# Patient Record
Sex: FEMALE | Race: WHITE | Employment: FULL TIME | URBAN - METROPOLITAN AREA
[De-identification: names, ages, dates, MRNs, and addresses within clinical notes are randomized per-mention and may not be internally consistent; named-entity substitution may affect disease eponyms.]

---

## 2020-07-14 ENCOUNTER — THERAPY VISIT (OUTPATIENT)
Dept: PHYSICAL THERAPY | Facility: CLINIC | Age: 25
End: 2020-07-14
Payer: COMMERCIAL

## 2020-07-14 DIAGNOSIS — G44.209 TENSION HEADACHE: ICD-10-CM

## 2020-07-14 DIAGNOSIS — M54.2 NECK PAIN: Primary | ICD-10-CM

## 2020-07-14 PROCEDURE — 97140 MANUAL THERAPY 1/> REGIONS: CPT | Mod: GP | Performed by: PHYSICAL THERAPIST

## 2020-07-14 PROCEDURE — 97110 THERAPEUTIC EXERCISES: CPT | Mod: GP | Performed by: PHYSICAL THERAPIST

## 2020-07-14 PROCEDURE — 97161 PT EVAL LOW COMPLEX 20 MIN: CPT | Mod: GP | Performed by: PHYSICAL THERAPIST

## 2020-07-14 NOTE — LETTER
[facility] ATHLETIC [facility] PHYSICAL [facility]  [address]  [facility] [address]  [phone]    July 15, 2020    Re: Marietta Wright   :   1995  MRN:  [id number]   REFERRING PHYSICIAN:   José Manuel Montenegro    [facility] ATHLETIC [facility] PHYSICAL [facility]  Date of Initial Evaluation:  20  Visits:  Rxs Used: 1  Reason for Referral:     Tension headache  Neck pain    EVALUATION SUMMARY  [facility] Initial Evaluation  Subjective:  The history is provided by the patient.   Patient Health History  Marietta Wright being seen for Neck pain/tension HA.   Problem began: 2020.   Problem occurred: started in 2018, did PT at first episode.   Pain is reported as 3/10 on pain scale.  General health as reported by patient is good.  Pertinent medical history includes: mental illness and migraines/headaches.   Current medications:  Anti-depressants and muscle relaxants.    Current occupation is .   Primary job tasks include:  Computer work and prolonged sitting.                Therapist Generated HPI Evaluation  Type of problem:  Cervical spine and thoracic spine.  This is a chronic condition.  Condition occurred with:  Other reason.  Patient reports pain:  Upper cervical spine and upper thoracic (sides of her head).  Pain radiates to:  Shoulder right (feels like a knot that tightens).   Associated symptoms:  Loss of motion/stiffness and headache. Symptoms are exacerbated by lying down and sitting  and relieved by muscle relaxants (would like to decrease muscle relaxant usage; Also wears prism lenses).             Objective:  Standing Alignment:    Cervical/Thoracic:  Forward head  Shoulder/UE:  Rounded shoulders, protracted scapula R and protracted scapula L  Cervical/Thoracic Evaluation  Arom wnl cervical: Ret produce, better, inc ROM.     AROM:  AROM Cervical:  Flexion:          Mod -; CTJ pain  Extension:       Excess upper cervical; min  -  Rotation:         Left: R side pain     Right: L side pain, pt reports it feels restricted  Re: Marietta Wright   :   1995    Side Bend:      Left:     Right:   Strength: poor DNF, poor scap proprio B  Headaches: cervical and migraine (constant cervical tension HA, migraine 2 a month)  Cervical Myotomes:  normal  Cervical Palpation:    Tenderness present at Left:    Sternocleidomstoid; Upper Trap; Facet and Suboccipitals  Tenderness present at Right:    Sternocleidomstoid; Scalenes; Upper Trap; Facet and Suboccipitals  Cervical Stability/Joint Clearing:  normal  Right positive at:  1st Rib Expansion    Assessment/Plan:    Patient is a 24 year old female with cervical and thoracic complaints.    Patient has the following significant findings with corresponding treatment plan.                Diagnosis 1:  Chronic tension HA, neck and upper back pain  Pain -  hot/cold therapy, manual therapy, self management, education, directional preference exercise and home program  Decreased ROM/flexibility - manual therapy, therapeutic exercise and home program  Decreased joint mobility - manual therapy, therapeutic exercise and home program  Decreased strength - therapeutic exercise, therapeutic activities and home program  Decreased proprioception - neuro re-education, therapeutic activities and home program  Impaired posture - neuro re-education, therapeutic activities and home program    Therapy Evaluation Codes:   Cumulative Therapy Evaluation is: Low complexity.  Previous and current functional limitations:  (See Goal Flow Sheet for this information)    Short term and Long term goals: (See Goal Flow Sheet for this information)   Communication ability:  Patient appears to be able to clearly communicate and understand verbal and written communication and follow directions correctly.  Treatment Explanation - The following has been discussed with the patient:   RX ordered/plan of care  Anticipated outcomes  Possible  risks and side effects  This patient would benefit from PT intervention to resume normal activities.   Rehab potential is good.  Frequency:  1 X week, once daily  Duration:  for 6 weeks tapering to 2 X a month over 4 weeks  Discharge Plan:  Achieve all LTG.  Independent in home treatment program.  Reach maximal therapeutic benefit.  Please refer to the daily flowsheet for treatment today, total treatment time and time spent performing 1:1 timed codes.     Thank you for your referral.  INQUIRIES  Therapist: Aline Whitt DPT, [facility]  [facility] PH[facility]ICAL THERAPY  [address] [place] [id number]  Phone: [phone]  Fax: [phone]

## 2020-07-14 NOTE — PROGRESS NOTES
[facility] Initial Evaluation  Subjective:  The history is provided by the patient.   Patient Health History  Marietta Wright being seen for Neck pain/tension HA.     Problem began: 7/13/2020.   Problem occurred: started in 2018, did PT at first episode.   Pain is reported as 3/10 on pain scale.  General health as reported by patient is good.  Pertinent medical history includes: mental illness and migraines/headaches.            Current medications:  Anti-depressants and muscle relaxants.    Current occupation is .   Primary job tasks include:  Computer work and prolonged sitting.                  Therapist Generated HPI Evaluation         Type of problem:  Cervical spine and thoracic spine.    This is a chronic condition.  Condition occurred with:  Other reason.    Patient reports pain:  Upper cervical spine and upper thoracic (sides of her head).    Pain radiates to:  Shoulder right (feels like a knot that tightens).     Associated symptoms:  Loss of motion/stiffness and headache. Symptoms are exacerbated by lying down and sitting  and relieved by muscle relaxants (would like to decrease muscle relaxant usage; Also wears prism lenses).                              Objective:  Standing Alignment:    Cervical/Thoracic:  Forward head  Shoulder/UE:  Rounded shoulders, protracted scapula R and protracted scapula L                                  Cervical/Thoracic Evaluation  Arom wnl cervical: Ret produce, better, inc ROM.     AROM:  AROM Cervical:    Flexion:          Mod -; CTJ pain  Extension:       Excess upper cervical; min -  Rotation:         Left: R side pain     Right: L side pain, pt reports it feels restricted  Side Bend:      Left:     Right:     Strength: poor DNF, poor scap proprio B  Headaches: cervical and migraine (constant cervical tension HA, migraine 2 a month)  Cervical Myotomes:  normal                        Cervical Palpation:    Tenderness present at Left:     Sternocleidomstoid; Upper Trap; Facet and Suboccipitals  Tenderness present at Right:    Sternocleidomstoid; Scalenes; Upper Trap; Facet and Suboccipitals    Cervical Stability/Joint Clearing:  normal    Right positive at:  1st Rib Expansion                                              General     ROS    Assessment/Plan:    Patient is a 24 year old female with cervical and thoracic complaints.    Patient has the following significant findings with corresponding treatment plan.                Diagnosis 1:  Chronic tension HA, neck and upper back pain  Pain -  hot/cold therapy, manual therapy, self management, education, directional preference exercise and home program  Decreased ROM/flexibility - manual therapy, therapeutic exercise and home program  Decreased joint mobility - manual therapy, therapeutic exercise and home program  Decreased strength - therapeutic exercise, therapeutic activities and home program  Decreased proprioception - neuro re-education, therapeutic activities and home program  Impaired posture - neuro re-education, therapeutic activities and home program    Therapy Evaluation Codes:     Cumulative Therapy Evaluation is: Low complexity.    Previous and current functional limitations:  (See Goal Flow Sheet for this information)    Short term and Long term goals: (See Goal Flow Sheet for this information)     Communication ability:  Patient appears to be able to clearly communicate and understand verbal and written communication and follow directions correctly.  Treatment Explanation - The following has been discussed with the patient:   RX ordered/plan of care  Anticipated outcomes  Possible risks and side effects  This patient would benefit from PT intervention to resume normal activities.   Rehab potential is good.    Frequency:  1 X week, once daily  Duration:  for 6 weeks tapering to 2 X a month over 4 weeks  Discharge Plan:  Achieve all LTG.  Independent in home treatment program.  Reach  maximal therapeutic benefit.    Please refer to the daily flowsheet for treatment today, total treatment time and time spent performing 1:1 timed codes.

## 2020-07-22 ENCOUNTER — THERAPY VISIT (OUTPATIENT)
Dept: PHYSICAL THERAPY | Facility: CLINIC | Age: 25
End: 2020-07-22
Payer: COMMERCIAL

## 2020-07-22 DIAGNOSIS — G44.209 TENSION HEADACHE: ICD-10-CM

## 2020-07-22 PROCEDURE — 97140 MANUAL THERAPY 1/> REGIONS: CPT | Mod: GP | Performed by: PHYSICAL THERAPIST

## 2020-07-22 PROCEDURE — 97110 THERAPEUTIC EXERCISES: CPT | Mod: GP | Performed by: PHYSICAL THERAPIST

## 2020-07-28 ENCOUNTER — THERAPY VISIT (OUTPATIENT)
Dept: PHYSICAL THERAPY | Facility: CLINIC | Age: 25
End: 2020-07-28
Payer: COMMERCIAL

## 2020-07-28 DIAGNOSIS — G44.209 TENSION HEADACHE: ICD-10-CM

## 2020-07-28 PROCEDURE — 97112 NEUROMUSCULAR REEDUCATION: CPT | Mod: GP | Performed by: PHYSICAL THERAPIST

## 2020-07-28 PROCEDURE — 97140 MANUAL THERAPY 1/> REGIONS: CPT | Mod: GP | Performed by: PHYSICAL THERAPIST

## 2020-07-28 PROCEDURE — 97110 THERAPEUTIC EXERCISES: CPT | Mod: GP | Performed by: PHYSICAL THERAPIST

## 2020-08-05 ENCOUNTER — THERAPY VISIT (OUTPATIENT)
Dept: PHYSICAL THERAPY | Facility: CLINIC | Age: 25
End: 2020-08-05
Payer: COMMERCIAL

## 2020-08-05 DIAGNOSIS — G44.209 TENSION HEADACHE: ICD-10-CM

## 2020-08-05 PROCEDURE — 97110 THERAPEUTIC EXERCISES: CPT | Mod: GP | Performed by: PHYSICAL THERAPIST

## 2020-08-05 PROCEDURE — 97140 MANUAL THERAPY 1/> REGIONS: CPT | Mod: GP | Performed by: PHYSICAL THERAPIST

## 2020-08-12 ENCOUNTER — THERAPY VISIT (OUTPATIENT)
Dept: PHYSICAL THERAPY | Facility: CLINIC | Age: 25
End: 2020-08-12
Payer: COMMERCIAL

## 2020-08-12 DIAGNOSIS — G44.209 TENSION HEADACHE: ICD-10-CM

## 2020-08-12 PROCEDURE — 97112 NEUROMUSCULAR REEDUCATION: CPT | Mod: GP | Performed by: PHYSICAL THERAPIST

## 2020-08-12 PROCEDURE — 97140 MANUAL THERAPY 1/> REGIONS: CPT | Mod: GP | Performed by: PHYSICAL THERAPIST

## 2020-08-12 PROCEDURE — 97110 THERAPEUTIC EXERCISES: CPT | Mod: GP | Performed by: PHYSICAL THERAPIST

## 2020-08-26 ENCOUNTER — THERAPY VISIT (OUTPATIENT)
Dept: PHYSICAL THERAPY | Facility: CLINIC | Age: 25
End: 2020-08-26
Payer: COMMERCIAL

## 2020-08-26 DIAGNOSIS — G44.209 TENSION HEADACHE: ICD-10-CM

## 2020-08-26 PROCEDURE — 97110 THERAPEUTIC EXERCISES: CPT | Mod: GP | Performed by: PHYSICAL THERAPIST

## 2020-08-26 PROCEDURE — 97140 MANUAL THERAPY 1/> REGIONS: CPT | Mod: GP | Performed by: PHYSICAL THERAPIST

## 2020-09-16 ENCOUNTER — THERAPY VISIT (OUTPATIENT)
Dept: PHYSICAL THERAPY | Facility: CLINIC | Age: 25
End: 2020-09-16
Payer: COMMERCIAL

## 2020-09-16 DIAGNOSIS — G44.209 TENSION HEADACHE: ICD-10-CM

## 2020-09-16 PROCEDURE — 97112 NEUROMUSCULAR REEDUCATION: CPT | Mod: GP | Performed by: PHYSICAL THERAPIST

## 2020-09-16 PROCEDURE — 97110 THERAPEUTIC EXERCISES: CPT | Mod: GP | Performed by: PHYSICAL THERAPIST

## 2020-10-06 ENCOUNTER — THERAPY VISIT (OUTPATIENT)
Dept: PHYSICAL THERAPY | Facility: CLINIC | Age: 25
End: 2020-10-06
Payer: COMMERCIAL

## 2020-10-06 DIAGNOSIS — M54.2 NECK PAIN: Primary | ICD-10-CM

## 2020-10-06 DIAGNOSIS — G44.209 TENSION HEADACHE: ICD-10-CM

## 2020-10-06 PROCEDURE — 97140 MANUAL THERAPY 1/> REGIONS: CPT | Mod: GP | Performed by: PHYSICAL THERAPIST

## 2020-10-06 PROCEDURE — 97110 THERAPEUTIC EXERCISES: CPT | Mod: GP | Performed by: PHYSICAL THERAPIST

## 2020-10-06 PROCEDURE — 97112 NEUROMUSCULAR REEDUCATION: CPT | Mod: GP | Performed by: PHYSICAL THERAPIST

## 2020-10-06 NOTE — LETTER
[facility] ATHLETIC [facility] PHYSICAL [facility]  [address]  [facility] [address]  [phone]    2020    Re: Marietta Wright   :   1995  MRN:  [id number]   REFERRING PHYSICIAN:   José Manuel Montenegro    [facility] ATHLETIC [facility] PHYSICAL [facility]    Date of Initial Evaluation:  20  Visits:  Rxs Used: 8  Reason for Referral:     Tension headache  Neck pain    PROGRESS  REPORT  Progress reporting period is from 2020 to 10/6/2020.     SUBJECTIVE  Subjective: Was camping this weekend, less sleep and busier schedule has had mild neck pain. Averages 1 HA/wk 4 or less out of 10 on a pain scale. Is able to mitigate sx with PT exercises.       The subjective and objective information are from the last SOAP note on this patient.    OBJECTIVE  Objective: Dec OH flex and Abd; B shoulder stiffness, Hypomobility upper thoracic spine. Improves with foam roller mobilization. L knee flex boggy at end range PROM; ttp distal med HS to pes bursa, prox med head gastroc tender; RPT knee ext with OP produce, better.       ASSESSMENT/PLAN  Updated problem list and treatment plan:  Neck Pain/HA    Pain -  hot/cold therapy, manual therapy, self management, education, directional preference exercise and home program  Decreased ROM/flexibility - manual therapy, therapeutic exercise and home program  Decreased joint mobility - manual therapy, therapeutic exercise and home program  Decreased strength - therapeutic exercise, therapeutic activities and home program  Decreased proprioception - neuro re-education, therapeutic activities and home program  Impaired posture - neuro re-education, therapeutic activities and home program  STG/LTGs have been met or progress has been made towards goals:  Yes (See Goal flow sheet completed today.)  Re: Marietta Wright   :   1995    Assessment of Progress: The patient's condition has potential to improve.  Patient is meeting short  term goals and is progressing towards long term goals.  Self Management Plans:  Patient has been instructed in a home treatment program.  Patient is independent in a home treatment program.  Patient  has been instructed in self management of symptoms.  I have re-evaluated this patient and find that the nature, scope, duration and intensity of the therapy is appropriate for the medical condition of the patient.  Marietta continues to require the following intervention to meet STG and LTG's: PT  The patient is returning to your office for a recheck appointment.    Recommendations:  This patient would benefit from continued therapy.     Frequency:  2 X a month, once daily  Duration:  for 2 months    Thank you for your referral.    INQUIRIES  Therapist: Cecille Whitt DPT   [facility] [place] [facility]ICAL THERAPY  [address] [place] [address] [address] [place] [id number]  Phone: [phone]  Fax: [phone]

## 2020-11-17 ENCOUNTER — THERAPY VISIT (OUTPATIENT)
Dept: PHYSICAL THERAPY | Facility: CLINIC | Age: 25
End: 2020-11-17
Payer: COMMERCIAL

## 2020-11-17 DIAGNOSIS — G44.209 TENSION HEADACHE: ICD-10-CM

## 2020-11-17 DIAGNOSIS — M54.2 NECK PAIN: Primary | ICD-10-CM

## 2020-11-17 PROCEDURE — 97112 NEUROMUSCULAR REEDUCATION: CPT | Mod: GP | Performed by: PHYSICAL THERAPIST

## 2020-11-17 PROCEDURE — 97110 THERAPEUTIC EXERCISES: CPT | Mod: GP | Performed by: PHYSICAL THERAPIST

## 2020-12-08 ENCOUNTER — THERAPY VISIT (OUTPATIENT)
Dept: PHYSICAL THERAPY | Facility: CLINIC | Age: 25
End: 2020-12-08
Payer: COMMERCIAL

## 2020-12-08 DIAGNOSIS — G44.209 TENSION HEADACHE: ICD-10-CM

## 2020-12-08 DIAGNOSIS — M54.2 NECK PAIN: Primary | ICD-10-CM

## 2020-12-08 PROCEDURE — 97110 THERAPEUTIC EXERCISES: CPT | Mod: GP | Performed by: PHYSICAL THERAPIST

## 2020-12-08 PROCEDURE — 97140 MANUAL THERAPY 1/> REGIONS: CPT | Mod: GP | Performed by: PHYSICAL THERAPIST

## 2021-01-05 ENCOUNTER — THERAPY VISIT (OUTPATIENT)
Dept: PHYSICAL THERAPY | Facility: CLINIC | Age: 26
End: 2021-01-05
Payer: COMMERCIAL

## 2021-01-05 DIAGNOSIS — M25.562 LEFT KNEE PAIN: ICD-10-CM

## 2021-01-05 DIAGNOSIS — M54.59 POSTURAL LOW BACK PAIN: ICD-10-CM

## 2021-01-05 DIAGNOSIS — M54.2 NECK PAIN: Primary | ICD-10-CM

## 2021-01-05 DIAGNOSIS — G44.209 TENSION HEADACHE: ICD-10-CM

## 2021-01-05 PROCEDURE — 97140 MANUAL THERAPY 1/> REGIONS: CPT | Mod: GP | Performed by: PHYSICAL THERAPIST

## 2021-01-05 PROCEDURE — 97110 THERAPEUTIC EXERCISES: CPT | Mod: GP | Performed by: PHYSICAL THERAPIST

## 2021-01-05 NOTE — LETTER
[facility]  [address]  [facility] [address]  [phone]    2021    Re: Marietta Wright   :   1995  MRN:  [id number]   REFERRING PHYSICIAN:   José Manuel Montenegro    [facility]    Date of Initial Evaluation:  20  Visits:  Rxs Used: 11  Reason for Referral:     Tension headache  Neck pain  Left knee pain  Postural low back pain    PROGRESS  REPORT  Progress reporting period is from 10/6/2020 to 2021.     SUBJECTIVE  Subjective: L knee and R low back bothersome. Primarily home workouts. Neck pain/HA inc yesterday w/RTW, but overall feeling better.       Changes in function: Yes, see goal flow sheet for change in function     The subjective and objective information are from the last SOAP note on this patient.    OBJECTIVE  Objective: R lateral shift noted with gait; lumbar AROM flex WNL, ext mod - with inc pain, SB produces R side pain. Inc tone L gastroc, TTP L popliteal space at HS tendon insertion. R side T10-12 hypomobile, ttp along R side ribs, dec mobility thoracolumbar paraspinals      ASSESSMENT/PLAN  Updated problem list and treatment plan:   Diagnosis 1:  Neck pain/HA    Pain -  self management, education, directional preference exercise and home program  Decreased strength - therapeutic exercise, therapeutic activities and home program  Decreased proprioception - neuro re-education, therapeutic activities and home program  Impaired posture - neuro re-education, therapeutic activities and home program      Re: Marietta Wright   :   1995    Diagnosis 2:  R side low back/L knee pain    Pain -  hot/cold therapy, manual therapy, self management, education and home program  Decreased ROM/flexibility - manual therapy, therapeutic exercise and home program  Decreased joint mobility - manual therapy, therapeutic exercise and home program  Decreased strength -  therapeutic exercise, therapeutic activities and home program  Impaired gait - gait training and home program  Decreased function - therapeutic activities and home program  STG/LTGs have been met or progress has been made towards goals:  Yes (See Goal flow sheet completed today.)  Assessment of Progress: The patient's condition is improving.  The patient's condition has potential to improve.  The patient has had set backs in their progress.  Patient is meeting short term goals and is progressing towards long term goals.  Self Management Plans:  Patient has been instructed in a home treatment program.  Patient  has been instructed in self management of symptoms.  I have re-evaluated this patient and find that the nature, scope, duration and intensity of the therapy is appropriate for the medical condition of the patient.  Marietta continues to require the following intervention to meet STG and LTG's: PT    Recommendations:  This patient would benefit from continued therapy.     Frequency:  2 X a month, once daily  Duration:  for 2 months    Thank you for your referral.    INQUIRIES  Therapist: Cecille Whitt DPT, [facility]   [facility] [place] PH[facility]ICAL THERAPY  [address] [place] [id number]  Phone: [phone]  Fax: [phone]

## 2021-01-05 NOTE — PROGRESS NOTES
Subjective:  HPI  Physical Exam                    Objective:  System    Physical Exam    General     ROS    Assessment/Plan:    PROGRESS  REPORT    Progress reporting period is from 10/6/2020 to 1/5/2021.     SUBJECTIVE  Subjective: L knee and R low back bothersome. Primarily home workouts. Neck pain/HA inc yesterday w/RTW, but overall feeling better.       Changes in function: Yes, see goal flow sheet for change in function     The subjective and objective information are from the last SOAP note on this patient.    OBJECTIVE  Objective: R lateral shift noted with gait; lumbar AROM flex WNL, ext mod - with inc pain, SB produces R side pain. Inc tone L gastroc, TTP L popliteal space at HS tendon insertion. R side T10-12 hypomobile, ttp along R side ribs, dec mobility thoracolumbar paraspinals      ASSESSMENT/PLAN  Updated problem list and treatment plan:   Diagnosis 1:  Neck pain/HA    Pain -  self management, education, directional preference exercise and home program  Decreased strength - therapeutic exercise, therapeutic activities and home program  Decreased proprioception - neuro re-education, therapeutic activities and home program  Impaired posture - neuro re-education, therapeutic activities and home program  Diagnosis 2:  R side low back/L knee pain     Pain -  hot/cold therapy, manual therapy, self management, education and home program  Decreased ROM/flexibility - manual therapy, therapeutic exercise and home program  Decreased joint mobility - manual therapy, therapeutic exercise and home program  Decreased strength - therapeutic exercise, therapeutic activities and home program  Impaired gait - gait training and home program  Decreased function - therapeutic activities and home program  STG/LTGs have been met or progress has been made towards goals:  Yes (See Goal flow sheet completed today.)  Assessment of Progress: The patient's condition is improving.  The patient's condition has potential to  improve.  The patient has had set backs in their progress.  Patient is meeting short term goals and is progressing towards long term goals.  Self Management Plans:  Patient has been instructed in a home treatment program.  Patient  has been instructed in self management of symptoms.  I have re-evaluated this patient and find that the nature, scope, duration and intensity of the therapy is appropriate for the medical condition of the patient.  Marietta continues to require the following intervention to meet STG and LTG's: PT      Recommendations:  This patient would benefit from continued therapy.     Frequency:  2 X a month, once daily  Duration:  for 2 months        Please refer to the daily flowsheet for treatment today, total treatment time and time spent performing 1:1 timed codes.

## 2021-01-14 ENCOUNTER — THERAPY VISIT (OUTPATIENT)
Dept: PHYSICAL THERAPY | Facility: CLINIC | Age: 26
End: 2021-01-14
Payer: COMMERCIAL

## 2021-01-14 DIAGNOSIS — M54.59 POSTURAL LOW BACK PAIN: ICD-10-CM

## 2021-01-14 DIAGNOSIS — M25.562 LEFT KNEE PAIN: ICD-10-CM

## 2021-01-14 DIAGNOSIS — M54.2 NECK PAIN: Primary | ICD-10-CM

## 2021-01-14 DIAGNOSIS — G44.209 TENSION HEADACHE: ICD-10-CM

## 2021-01-14 PROCEDURE — 97140 MANUAL THERAPY 1/> REGIONS: CPT | Mod: GP | Performed by: PHYSICAL THERAPIST

## 2021-01-14 PROCEDURE — 97110 THERAPEUTIC EXERCISES: CPT | Mod: GP | Performed by: PHYSICAL THERAPIST

## 2021-02-04 ENCOUNTER — THERAPY VISIT (OUTPATIENT)
Dept: PHYSICAL THERAPY | Facility: CLINIC | Age: 26
End: 2021-02-04
Payer: COMMERCIAL

## 2021-02-04 DIAGNOSIS — M54.59 POSTURAL LOW BACK PAIN: ICD-10-CM

## 2021-02-04 DIAGNOSIS — G44.209 TENSION HEADACHE: ICD-10-CM

## 2021-02-04 DIAGNOSIS — M25.562 LEFT KNEE PAIN: ICD-10-CM

## 2021-02-04 DIAGNOSIS — M54.2 NECK PAIN: Primary | ICD-10-CM

## 2021-02-04 PROCEDURE — 97140 MANUAL THERAPY 1/> REGIONS: CPT | Mod: GP | Performed by: PHYSICAL THERAPIST

## 2021-02-04 PROCEDURE — 97112 NEUROMUSCULAR REEDUCATION: CPT | Mod: GP | Performed by: PHYSICAL THERAPIST

## 2021-02-11 ENCOUNTER — THERAPY VISIT (OUTPATIENT)
Dept: PHYSICAL THERAPY | Facility: CLINIC | Age: 26
End: 2021-02-11
Payer: COMMERCIAL

## 2021-02-11 DIAGNOSIS — G44.209 TENSION HEADACHE: ICD-10-CM

## 2021-02-11 DIAGNOSIS — M25.562 LEFT KNEE PAIN: ICD-10-CM

## 2021-02-11 DIAGNOSIS — M54.2 NECK PAIN: Primary | ICD-10-CM

## 2021-02-11 PROCEDURE — 97140 MANUAL THERAPY 1/> REGIONS: CPT | Mod: GP | Performed by: PHYSICAL THERAPIST

## 2021-02-11 PROCEDURE — 97110 THERAPEUTIC EXERCISES: CPT | Mod: GP | Performed by: PHYSICAL THERAPIST

## 2021-03-02 ENCOUNTER — THERAPY VISIT (OUTPATIENT)
Dept: PHYSICAL THERAPY | Facility: CLINIC | Age: 26
End: 2021-03-02
Payer: COMMERCIAL

## 2021-03-02 DIAGNOSIS — G44.209 TENSION HEADACHE: ICD-10-CM

## 2021-03-02 DIAGNOSIS — M54.2 NECK PAIN: Primary | ICD-10-CM

## 2021-03-02 PROCEDURE — 97140 MANUAL THERAPY 1/> REGIONS: CPT | Mod: GP | Performed by: PHYSICAL THERAPIST

## 2021-03-02 NOTE — LETTER
[facility] ATHLETIC [facility] PHYSICAL [facility]  [address]  [facility] [address]  [phone]    2021    Re: Marietta Wright   :   1995  MRN:  [id number]   REFERRING PHYSICIAN:   José Manuel Montenegro    [facility] ATHLETIC [facility] PHYSICAL [facility]    Date of Initial Evaluation:  20  Visits:  Rxs Used: 15  Reason for Referral:     Tension headache  Neck pain    PROGRESS  REPORT  Progress reporting period is from 2021 to 3/2/2021.     SUBJECTIVE  Subjective: Has had inc HA the last 2 weeks, has had difficulty resolving on her own. Attributes it to increased time studying for next exam. Saw the Chiropractor which was helpful but temporary. Some sensitivity to light with HA. Tried her neck HEP, without relief       Changes in function: Yes, see goal flow sheet for change in function   Adverse reactions:inc studying/desk time produced worsening HA   The subjective and objective information are from the last SOAP note on this patient.    OBJECTIVE  Objective: Cspine AROM; dec motion R SB and L Rot (pt states she fell bouldering this weekend, some neck soreness after that). Ret mod -. L > R suboccipital restriction, B levator tightness, R subclavius tightness.      ASSESSMENT/PLAN  Updated problem list and treatment plan:   Diagnosis 1:  Neck Pain/HA    Pain -  hot/cold therapy, manual therapy, self management, education, directional preference exercise and home program  Decreased ROM/flexibility - manual therapy, therapeutic exercise and home program  Decreased joint mobility - manual therapy, therapeutic exercise and home program  Decreased strength - therapeutic exercise, therapeutic activities and home program  Impaired posture - neuro re-education and home program  Diagnosis 2:  L knee Pain     Re: Marietta Wright   :   1995    Did not treat today; Pain, Dec flexibility and Dec Strength. Pt continues to benefit from skilled PT for manual  therapy and HEP  STG/LTGs have been met or progress has been made towards goals:  Yes (See Goal flow sheet completed today.)  Assessment of Progress: The patient's condition has potential to improve.  The patient has had set backs in their progress.  Self Management Plans:  Patient has been instructed in a home treatment program.  Patient is independent in a home treatment program.  Patient  has been instructed in self management of symptoms.  I have re-evaluated this patient and find that the nature, scope, duration and intensity of the therapy is appropriate for the medical condition of the patient.  Marietta continues to require the following intervention to meet STG and LTG's: PT    Recommendations:  This patient would benefit from continued therapy.     Frequency:  2 X a month, once daily  Duration:  for 2 months    Thank you for your referral.    INQUIRIES  Therapist: Cecille Whitt DPT   [facility] [place] PHYSICAL THERAPY  [address] [place], [place] [id number]  [facility] [address]  Phone: [phone]  Fax: [phone]

## 2021-03-02 NOTE — PROGRESS NOTES
Subjective:  HPI  Physical Exam                    Objective:  System    Physical Exam    General     ROS    Assessment/Plan:    PROGRESS  REPORT    Progress reporting period is from 1/5/2021 to 3/2/2021.     SUBJECTIVE  Subjective: Has had inc HA the last 2 weeks, has had difficulty resolving on her own. Attributes it to increased time studying for next exam. Saw the Chiropractor which was helpful but temporary. Some sensitivity to light with HA. Tried her neck HEP, without relief       Changes in function: Yes, see goal flow sheet for change in function   Adverse reactions:inc studying/desk time produced worsening HA   The subjective and objective information are from the last SOAP note on this patient.    OBJECTIVE  Objective: Cspine AROM; dec motion R SB and L Rot (pt states she fell bouldering this weekend, some neck soreness after that). Ret mod -. L > R suboccipital restriction, B levator tightness, R subclavius tightness.      ASSESSMENT/PLAN  Updated problem list and treatment plan:   Diagnosis 1:  Neck Pain/HA    Pain -  hot/cold therapy, manual therapy, self management, education, directional preference exercise and home program  Decreased ROM/flexibility - manual therapy, therapeutic exercise and home program  Decreased joint mobility - manual therapy, therapeutic exercise and home program  Decreased strength - therapeutic exercise, therapeutic activities and home program  Impaired posture - neuro re-education and home program  Diagnosis 2:  L knee Pain     Did not treat today; Pain, Dec flexibility and Dec Strength. Pt continues to benefit from skilled PT for manual therapy and HEP  STG/LTGs have been met or progress has been made towards goals:  Yes (See Goal flow sheet completed today.)  Assessment of Progress: The patient's condition has potential to improve.  The patient has had set backs in their progress.  Self Management Plans:  Patient has been instructed in a home treatment program.  Patient is  independent in a home treatment program.  Patient  has been instructed in self management of symptoms.  I have re-evaluated this patient and find that the nature, scope, duration and intensity of the therapy is appropriate for the medical condition of the patient.  Marietta continues to require the following intervention to meet STG and LTG's: PT      Recommendations:  This patient would benefit from continued therapy.     Frequency:  2 X a month, once daily  Duration:  for 2 months        Please refer to the daily flowsheet for treatment today, total treatment time and time spent performing 1:1 timed codes.

## 2021-03-10 ENCOUNTER — THERAPY VISIT (OUTPATIENT)
Dept: PHYSICAL THERAPY | Facility: CLINIC | Age: 26
End: 2021-03-10
Payer: COMMERCIAL

## 2021-03-10 DIAGNOSIS — M54.2 NECK PAIN: Primary | ICD-10-CM

## 2021-03-10 DIAGNOSIS — G44.209 TENSION HEADACHE: ICD-10-CM

## 2021-03-10 PROCEDURE — 97112 NEUROMUSCULAR REEDUCATION: CPT | Mod: GP | Performed by: PHYSICAL THERAPIST

## 2021-03-10 PROCEDURE — 97140 MANUAL THERAPY 1/> REGIONS: CPT | Mod: GP | Performed by: PHYSICAL THERAPIST

## 2021-03-24 ENCOUNTER — THERAPY VISIT (OUTPATIENT)
Dept: PHYSICAL THERAPY | Facility: CLINIC | Age: 26
End: 2021-03-24
Payer: COMMERCIAL

## 2021-03-24 DIAGNOSIS — G44.209 TENSION HEADACHE: ICD-10-CM

## 2021-03-24 PROCEDURE — 97110 THERAPEUTIC EXERCISES: CPT | Mod: GP | Performed by: PHYSICAL THERAPIST

## 2021-03-24 PROCEDURE — 97140 MANUAL THERAPY 1/> REGIONS: CPT | Mod: GP | Performed by: PHYSICAL THERAPIST

## 2021-04-08 ENCOUNTER — THERAPY VISIT (OUTPATIENT)
Dept: PHYSICAL THERAPY | Facility: CLINIC | Age: 26
End: 2021-04-08
Payer: COMMERCIAL

## 2021-04-08 DIAGNOSIS — G44.209 TENSION HEADACHE: ICD-10-CM

## 2021-04-08 DIAGNOSIS — M54.2 NECK PAIN: Primary | ICD-10-CM

## 2021-04-08 PROCEDURE — 97140 MANUAL THERAPY 1/> REGIONS: CPT | Mod: GP | Performed by: PHYSICAL THERAPIST

## 2021-04-08 PROCEDURE — 97110 THERAPEUTIC EXERCISES: CPT | Mod: GP | Performed by: PHYSICAL THERAPIST

## 2021-04-27 ENCOUNTER — THERAPY VISIT (OUTPATIENT)
Dept: PHYSICAL THERAPY | Facility: CLINIC | Age: 26
End: 2021-04-27
Payer: COMMERCIAL

## 2021-04-27 DIAGNOSIS — M54.2 NECK PAIN: Primary | ICD-10-CM

## 2021-04-27 DIAGNOSIS — M54.59 POSTURAL LOW BACK PAIN: ICD-10-CM

## 2021-04-27 DIAGNOSIS — G44.209 TENSION HEADACHE: ICD-10-CM

## 2021-04-27 PROCEDURE — 97140 MANUAL THERAPY 1/> REGIONS: CPT | Mod: GP | Performed by: PHYSICAL THERAPIST

## 2021-04-27 PROCEDURE — 97110 THERAPEUTIC EXERCISES: CPT | Mod: GP | Performed by: PHYSICAL THERAPIST

## 2021-05-12 ENCOUNTER — THERAPY VISIT (OUTPATIENT)
Dept: PHYSICAL THERAPY | Facility: CLINIC | Age: 26
End: 2021-05-12
Payer: COMMERCIAL

## 2021-05-12 DIAGNOSIS — M54.2 NECK PAIN: Primary | ICD-10-CM

## 2021-05-12 DIAGNOSIS — G44.209 TENSION HEADACHE: ICD-10-CM

## 2021-05-12 PROCEDURE — 97140 MANUAL THERAPY 1/> REGIONS: CPT | Mod: GP | Performed by: PHYSICAL THERAPIST

## 2021-05-12 PROCEDURE — 97112 NEUROMUSCULAR REEDUCATION: CPT | Mod: GP | Performed by: PHYSICAL THERAPIST

## 2021-05-12 PROCEDURE — 97110 THERAPEUTIC EXERCISES: CPT | Mod: GP | Performed by: PHYSICAL THERAPIST

## 2021-06-23 ENCOUNTER — THERAPY VISIT (OUTPATIENT)
Dept: PHYSICAL THERAPY | Facility: CLINIC | Age: 26
End: 2021-06-23
Payer: COMMERCIAL

## 2021-06-23 DIAGNOSIS — G44.209 TENSION HEADACHE: ICD-10-CM

## 2021-06-23 PROCEDURE — 97110 THERAPEUTIC EXERCISES: CPT | Mod: GP | Performed by: PHYSICAL THERAPIST

## 2021-06-23 PROCEDURE — 97140 MANUAL THERAPY 1/> REGIONS: CPT | Mod: GP | Performed by: PHYSICAL THERAPIST

## 2021-06-23 NOTE — PROGRESS NOTES
Subjective:  HPI  Physical Exam                    Objective:  System    Physical Exam    General     ROS    Assessment/Plan:    PROGRESS  REPORT    Progress reporting period is from 7/14/2020 to 6/23/21.     SUBJECTIVE  Subjective: Few migraines if any, neck is feeling better. Slight temple HA today on the R. back is feeling better with HEP.  Working out regularly        The subjective and objective information are from the last SOAP note on this patient.    OBJECTIVE  Objective: Cspine AROM WNL, R shoulder abd AROM restricted > 100 deg with catch on R post shoulder. Lumbar AROM wnl. Good glute max proprio B.       ASSESSMENT/PLAN  Updated problem list and treatment plan:    Neck Pain, HA    Decreased ROM/flexibility - manual therapy, therapeutic exercise and home program  Decreased strength - therapeutic exercise, therapeutic activities and home program  Decreased proprioception - neuro re-education, therapeutic activities and home program  Impaired posture - neuro re-education and home program  STG/LTGs have been met or progress has been made towards goals:  Yes (See Goal flow sheet completed today.)  Assessment of Progress: The patient's condition has potential to improve.  The patient has had set backs in their progress.  Self Management Plans:  Patient has been instructed in a home treatment program.  Patient is independent in a home treatment program.  Patient  has been instructed in self management of symptoms.  I have re-evaluated this patient and find that the nature, scope, duration and intensity of the therapy is appropriate for the medical condition of the patient.  Marietta continues to require the following intervention to meet STG and LTG's: PT    Marietta demonstrates functional improvements which indicate she is ready for independence in HEP in order to maintain Cspine mobility, manage HA sx and make functional progress in strength. Marietta would benefit from re-evaluation in 4-6 wks in order to establish a  plan for sx management with return to office in September.    The patient is returning to your office for a recheck appointment.    Recommendations:  This patient would benefit from further evaluation.    Please refer to the daily flowsheet for treatment today, total treatment time and time spent performing 1:1 timed codes.

## 2021-06-23 NOTE — LETTER
[facility]  [address]  [place] [address]  [phone]    2021    Re: Marietta Wright   :   1995  MRN:  [id number]   REFERRING PHYSICIAN:   José Manuel GAS[facility]    Date of Initial Evaluation:  20  Visits:  Rxs Used: 21  Reason for Referral:  Tension headache    PROGRESS  REPORT  Progress reporting period is from 2020 to 21.     SUBJECTIVE  Subjective: Few migraines if any, neck is feeling better. Slight temple HA today on the R. back is feeling better with HEP.  Working out regularly        The subjective and objective information are from the last SOAP note on this patient.    OBJECTIVE  Objective: Cspine AROM WNL, R shoulder abd AROM restricted > 100 deg with catch on R post shoulder. Lumbar AROM wnl. Good glute max proprio B.       ASSESSMENT/PLAN  Updated problem list and treatment plan:    Neck Pain, HA    Decreased ROM/flexibility - manual therapy, therapeutic exercise and home program  Decreased strength - therapeutic exercise, therapeutic activities and home program  Decreased proprioception - neuro re-education, therapeutic activities and home program  Impaired posture - neuro re-education and home program  STG/LTGs have been met or progress has been made towards goals:  Yes (See Goal flow sheet completed today.)  Assessment of Progress: The patient's condition has potential to improve.  The patient has had set backs in their progress.  Self Management Plans:  Patient has been instructed in a home treatment program.  Patient is independent in a home treatment program.  Patient  has been instructed in self management of symptoms.  Re: Marietta GASPAR Kyle   :   1995    I have re-evaluated this patient and find that the nature, scope, duration and intensity of the therapy is appropriate for the medical condition of the patient.  Marietta continues to require the  following intervention to meet STG and LTG's: PT    Marietta demonstrates functional improvements which indicate she is ready for independence in HEP in order to maintain Cspine mobility, manage HA sx and make functional progress in strength. Marietta would benefit from re-evaluation in 4-6 wks in order to establish a plan for sx management with return to office in September.    The patient is returning to your office for a recheck appointment.    Recommendations:  This patient would benefit from further evaluation.    Thank you for your referral.    INQUIRIES  Therapist: Cecille Lepe DPT   [address] [place] [id number]  Phone: [phone]  Fax: [phone]

## 2021-07-28 ENCOUNTER — THERAPY VISIT (OUTPATIENT)
Dept: PHYSICAL THERAPY | Facility: CLINIC | Age: 26
End: 2021-07-28
Payer: COMMERCIAL

## 2021-07-28 DIAGNOSIS — M54.2 NECK PAIN: Primary | ICD-10-CM

## 2021-07-28 DIAGNOSIS — G44.209 TENSION HEADACHE: ICD-10-CM

## 2021-07-28 PROCEDURE — 97110 THERAPEUTIC EXERCISES: CPT | Mod: GP | Performed by: PHYSICAL THERAPIST

## 2021-11-21 PROBLEM — G44.209 TENSION HEADACHE: Status: RESOLVED | Noted: 2020-07-14 | Resolved: 2021-11-21

## 2021-11-21 NOTE — PROGRESS NOTES
DISCHARGE REPORT    Marietta did not return for further treatment after the last visit on 7/28/21.   Current status is unknown.  Please see information below for last known relevant information.  Patient seen for 22 visits.    SUBJECTIVE  Subjective changes noted by patient:  Has been back into the office, moved homes and has returned to studying for her next exam. Notes some inc in freq of temporal HA, and R inf scapular pain.  .  Current pain level is  .     Previous pain level was   .   Changes in function: (See Goal flowsheet attached for changes in current functional level)  Adverse reaction to treatment or activity: None    OBJECTIVE  Changes noted in objective findings:   + lambert cervical. Mod - Ret Cspine AROM. Dec upper cervical ext. nil - Cspine rotation. RPT Ret + OP, RET +Ext.     ASSESSMENT/PLAN  Diagnosis: Neck/LBP   Updated problem list and treatment plan:  Patient will continue to utilize HEP for any remaining deficits.   STG/LTGs have been met or progress has been made towards goals:  Please see goal flowsheet for most current information  Assessment of Progress: current status is unknown.    Last current status: Pt is progressing as expected   Self Management Plans:  HEP  I have re-evaluated this patient and find that the nature, scope, duration and intensity of the therapy is appropriate for the medical condition of the patient.  Marietta continues to require the following intervention to meet STG and LTG's:  HEP.    Recommendations:  Discharge with current home program.  Patient to follow up with MD as needed.    Please refer to the daily flowsheet for treatment today, total treatment time and time spent performing 1:1 timed codes.

## 2022-08-01 NOTE — PROGRESS NOTES
Subjective:  HPI  Physical Exam                    Objective:  System    Physical Exam    General     ROS    Assessment/Plan:    PROGRESS  REPORT    Progress reporting period is from 7/14/2020 to 10/6/2020.     SUBJECTIVE  Subjective: Was camping this weekend, less sleep and busier schedule has had mild neck pain. Averages 1 HA/wk 4 or less out of 10 on a pain scale. Is able to mitigate sx with PT exercises.                ;   ,     The subjective and objective information are from the last SOAP note on this patient.    OBJECTIVE  Objective: Dec OH flex and Abd; B shoulder stiffness, Hypomobility upper thoracic spine. Improves with foam roller mobilization. L knee flex boggy at end range PROM; ttp distal med HS to pes bursa, prox med head gastroc tender; RPT knee ext with OP produce, better.       ASSESSMENT/PLAN  Updated problem list and treatment plan:  Neck Pain/HA    Pain -  hot/cold therapy, manual therapy, self management, education, directional preference exercise and home program  Decreased ROM/flexibility - manual therapy, therapeutic exercise and home program  Decreased joint mobility - manual therapy, therapeutic exercise and home program  Decreased strength - therapeutic exercise, therapeutic activities and home program  Decreased proprioception - neuro re-education, therapeutic activities and home program  Impaired posture - neuro re-education, therapeutic activities and home program  STG/LTGs have been met or progress has been made towards goals:  Yes (See Goal flow sheet completed today.)  Assessment of Progress: The patient's condition has potential to improve.  Patient is meeting short term goals and is progressing towards long term goals.  Self Management Plans:  Patient has been instructed in a home treatment program.  Patient is independent in a home treatment program.  Patient  has been instructed in self management of symptoms.  I have re-evaluated this patient and find that the nature, scope,  duration and intensity of the therapy is appropriate for the medical condition of the patient.  Marietta continues to require the following intervention to meet STG and LTG's: PT  The patient is returning to your office for a recheck appointment.    Recommendations:  This patient would benefit from continued therapy.     Frequency:  2 X a month, once daily  Duration:  for 2 months        Please refer to the daily flowsheet for treatment today, total treatment time and time spent performing 1:1 timed codes.     What Is The Reason For Today's Visit?: Full Body Skin Examination with No Concerns What Is The Reason For Today's Visit? (Being Monitored For X): surveillance against the recurrence of atypical nevi